# Patient Record
(demographics unavailable — no encounter records)

---

## 2025-01-28 NOTE — CONSULT LETTER
[Dear  ___] : Dear  [unfilled], [Consult Letter:] : I had the pleasure of evaluating your patient, [unfilled]. [Consult Closing:] : Thank you very much for allowing me to participate in the care of this patient.  If you have any questions, please do not hesitate to contact me. [Please see my note below.] : Please see my note below. [Sincerely,] : Sincerely, [DrZac  ___] : Dr. SAUNDERS

## 2025-01-28 NOTE — CONSULT LETTER
[Dear  ___] : Dear  [unfilled], [Consult Letter:] : I had the pleasure of evaluating your patient, [unfilled]. [Please see my note below.] : Please see my note below. [Consult Closing:] : Thank you very much for allowing me to participate in the care of this patient.  If you have any questions, please do not hesitate to contact me. [Sincerely,] : Sincerely, [DrZac  ___] : Dr. SAUNDERS

## 2025-01-30 NOTE — HISTORY OF PRESENT ILLNESS
[TextBox_4] : 1/28/25  54F SOB post Ozempic for DM H/O asthma previously on Xolair for 10 or more years with excellent benefit. Frequent asthma exacerbations requiring and responding to steroids - at least 4 steroid courses in 2024.  GERD Rhinitis Migraine Post Menopause Alpha Gal  Fibromyalgia Snoring  On Symbicort, Albuterol and montelukast - having rxn to symbicort

## 2025-01-30 NOTE — RESULTS/DATA
[TextEntry] : Allergy Labs; 1/28/25: Eo count =200 (); MLA=996 (<=100); 3+ Dog, Mold; 2+ Cat,; 1+George Grass

## 2025-01-30 NOTE — RESULTS/DATA
[TextEntry] : Allergy Labs; 1/28/25: Eo count =200 (); AVC=387 (<=100); 3+ Dog, Mold; 2+ Cat,; 1+George Grass

## 2025-01-30 NOTE — RESULTS/DATA
[TextEntry] : Allergy Labs; 1/28/25: Eo count =200 (); MTE=971 (<=100); 3+ Dog, Mold; 2+ Cat,; 1+George Grass

## 2025-01-30 NOTE — DISCUSSION/SUMMARY
[FreeTextEntry1] : IMP  Asthma GERD Allergy  Plan  Trelegy Spacer for MDI Asthma profile and Eo count Prednisone

## 2025-04-02 NOTE — HISTORY OF PRESENT ILLNESS
[TextBox_4] : 1/28/25  54F SOB post Ozempic for DM H/O asthma previously on Xolair for 10 or more years with excellent benefit. Frequent asthma exacerbations requiring and responding to steroids - at least 4 steroid courses in 2024.  GERD Rhinitis Migraine Post Menopause Alpha Gal  Fibromyalgia Snoring  On Symbicort, Albuterol and montelukast - having rxn to symbicort  4/2/25 Started Fasenra injections  Doing well despite having two cats and one dog Trelegy=>thrush X 2 requiring nystatin; Better with warm saltwater rinse post Trelegy